# Patient Record
Sex: MALE | Race: WHITE | NOT HISPANIC OR LATINO | URBAN - METROPOLITAN AREA
[De-identification: names, ages, dates, MRNs, and addresses within clinical notes are randomized per-mention and may not be internally consistent; named-entity substitution may affect disease eponyms.]

---

## 2018-06-05 ENCOUNTER — OFFICE VISIT (OUTPATIENT)
Dept: ORTHOPEDIC SURGERY | Facility: CLINIC | Age: 58
End: 2018-06-05

## 2018-06-05 VITALS — TEMPERATURE: 98.2 F | HEIGHT: 73 IN | WEIGHT: 200 LBS | BODY MASS INDEX: 26.51 KG/M2

## 2018-06-05 DIAGNOSIS — M47.22 CERVICAL SPONDYLOSIS WITH RADICULOPATHY: Primary | ICD-10-CM

## 2018-06-05 PROCEDURE — 99205 OFFICE O/P NEW HI 60 MIN: CPT | Performed by: ORTHOPAEDIC SURGERY

## 2018-06-05 RX ORDER — ASPIRIN 325 MG
325 TABLET ORAL DAILY
COMMUNITY

## 2018-06-05 RX ORDER — LOSARTAN POTASSIUM AND HYDROCHLOROTHIAZIDE 25; 100 MG/1; MG/1
1 TABLET ORAL DAILY
COMMUNITY

## 2018-06-05 RX ORDER — ATORVASTATIN CALCIUM 40 MG/1
40 TABLET, FILM COATED ORAL DAILY
COMMUNITY

## 2018-06-05 RX ORDER — LEVETIRACETAM 500 MG/1
TABLET ORAL
COMMUNITY
Start: 2014-10-01

## 2018-06-05 RX ORDER — CLOPIDOGREL BISULFATE 75 MG/1
75 TABLET ORAL DAILY
COMMUNITY

## 2018-06-05 NOTE — PROGRESS NOTES
New patient or new problem visit    Chief Complaint   Patient presents with   • Cervical Spine - Pain       HPI: He complains primarily of numbness tingling and pain in the left arm and weakness.  He states he could not crural 5 pounds right now if he had to to save his life.  It's his left arm, but he is right-hand dominant.  This started about a year ago.  He has no neck pain but notes occasional crepitus.  A Medrol Dosepak helped with the arm for a while as did epidural injections but they were temporary.  He sees Dr. Ally damian to pain clinic.  Pain is moderate grinding aching and burning.  Denies balance difficulties bowel or bladder complaints.  He is however a 1-1/2 pack per day cigarette smoker.    PFSH: See chart- reviewed    Review of Systems   Constitutional: Negative for chills, fever and unexpected weight change.   HENT: Negative for trouble swallowing and voice change.    Eyes: Negative for visual disturbance.   Respiratory: Negative for cough and shortness of breath.    Cardiovascular: Negative for chest pain and leg swelling.   Gastrointestinal: Negative for abdominal pain, nausea and vomiting.   Endocrine: Negative for cold intolerance and heat intolerance.   Genitourinary: Negative for difficulty urinating, frequency and urgency.   Skin: Negative for rash and wound.   Allergic/Immunologic: Negative for immunocompromised state.   Neurological: Positive for numbness. Negative for weakness.   Hematological: Does not bruise/bleed easily.   Psychiatric/Behavioral: Negative for dysphoric mood. The patient is not nervous/anxious.        PE: Constitutional: Vital signs above-noted.  Awake, alert and oriented    Psychiatric: Affect and insight do not appear grossly disturbed.    Pulmonary: Breathing is unlabored, color is good.    Skin: Warm, dry and normal turgor    Cardiac:  radial pulses intact.  No arm edema.    Eyesight and hearing appear adequate for examination purposes    Musculoskeletal:  Posture  is unremarkable to coronal and sagittal inspection.  Motion appears undisturbed.  The skin about the area is intact.  There is no palpable or visible deformity.  There is no local spasm. There is no tenderness to percussion and palpation of the spine.     Neurologic:  In the bilateral upper extremities there is notable atrophy in the left biceps and brachioradialis.  Motor function is undisturbed in shoulder abduction, elbow flexion, wrist extension, finger extension, triceps extension, or finger abduction on the right but on the left side he has antigravity strength in the elbow flexion and supination of the hand and appears otherwise quite strong.  Sensation appears symmetrically intact to light touch in the right and on the left is diminished in a C5 dermatome.  Reflexes are on the right in the biceps, brachioradialis, and triceps, but on the left side the biceps reflex is absent. Galicia test is negative.  Gait appears undisturbed.  Spurling test is negative.  No evidence of clonus in the feet      MEDICAL DECISION MAKING    XRAY: Plain film x-rays of the cervical spine show spondylosis at multiple levels primarily at 56 and C6 7 but a well-maintained lordosis and AP posture.  No lung apical lesions are noted.  MRI scan of the cervical spine shows left-sided foraminal stenosis actually bilateral broad-based stenosis also causing moderate canal stenosis at C5-6 and then more foraminal changes at C4 5.  C3 4 show some right-sided foraminal changes and C6 7 shows some mild right-sided foraminal narrowing as well.    Other: n/a    Impression: He has C4-5 and C5-6 spondylosis causing local foraminal stenosis and asymptomatic moderate canal stenosis.  The levels above and below are degenerative but do not appear to be contributing to his neurologic abnormalities, and he has no pain.  Therefore I think we can ignore the matched neurologic areas and concentrate on C4-5 and C5-6.    Plan: C4-5 C5-6 anterior cervical  discectomy and fusion with allograft and plate.I discussed the risks and benefits of anterior cervical discectomy and fusion with instrumentation and allograft or mechanical strut placement.  Risks include adverse anesthetic events such as death, stroke and myocardial infarction.  More specific surgical complications include infection, nonunion, and persistent pain.  Less likely problems include hardware failure, hoarseness, prolonged difficulty swallowing, visceral or vascular injury, pneumothorax, graft extrusion, and paralysis, among others. There is a 90 percent chance of success.   Alternatives were also discussed.  After careful consideration the patient wishes to proceed with surgery but wants to include his wife in the decision making.